# Patient Record
Sex: FEMALE | Race: WHITE
[De-identification: names, ages, dates, MRNs, and addresses within clinical notes are randomized per-mention and may not be internally consistent; named-entity substitution may affect disease eponyms.]

---

## 2017-07-03 ENCOUNTER — HOSPITAL ENCOUNTER (INPATIENT)
Dept: HOSPITAL 56 - MW.OBCHECK | Age: 32
LOS: 3 days | Discharge: HOME | End: 2017-07-06
Attending: OBSTETRICS & GYNECOLOGY | Admitting: OBSTETRICS & GYNECOLOGY
Payer: MEDICAID

## 2017-07-03 DIAGNOSIS — Z3A.39: ICD-10-CM

## 2017-07-04 RX ADMIN — BUTORPHANOL TARTRATE PRN MG: 1 INJECTION INTRAMUSCULAR; INTRAVENOUS at 03:03

## 2017-07-04 RX ADMIN — BUTORPHANOL TARTRATE PRN MG: 1 INJECTION INTRAMUSCULAR; INTRAVENOUS at 06:12

## 2017-07-04 RX ADMIN — BUTORPHANOL TARTRATE PRN MG: 1 INJECTION INTRAMUSCULAR; INTRAVENOUS at 12:53

## 2017-07-04 RX ADMIN — MISOPROSTOL PRN MCG: 100 TABLET ORAL at 06:09

## 2017-07-04 RX ADMIN — MISOPROSTOL PRN MCG: 100 TABLET ORAL at 01:00

## 2017-07-04 NOTE — PCM.PREANE
Preanesthetic Assessment





- Anesthesia/Transfusion/Family Hx


Anesthesia History: No Prior Anesthesia


Family History of Anesthesia Reaction: No


Transfusion History: No Prior Transfusion(s)





- Review of Systems


General: No Symptoms


Pulmonary: No Symptoms


Cardiovascular: No Symptoms


Gastrointestinal: No symptoms


Neurological: No Symptoms


Other: Reports: Diabetes (Gestational)





- Physical Assessment


NPO Status Date: 07/04/17


NPO Status Time: 15:48 (clear liquids)


Height: 1.57 m


Weight: 79.832 kg


ASA Class: 3


Mental Status: Alert & Oriented x3


Airway Class: Mallampati = 3


Dentition: Reports: Normal Dentition


Thyro-Mental Finger Breadths: 3


Mouth Opening Finger Breadths: 3


ROM/Head Extension: Full


Lungs: Clear to auscultation, Normal respiratory effort


Cardiovascular: Regular Rate, Regular Rhythm





- Lab


Values: 





 Laboratory Last Values











WBC  10.05 K/uL (4.0-11.0)   07/03/17  21:00    


 


RBC  4.00 M/uL (4.30-5.90)  L  07/03/17  21:00    


 


Hgb  9.9 g/dL (12.0-16.0)  L  07/03/17  21:00    


 


Hct  31.7 % (36.0-46.0)  L  07/03/17  21:00    


 


MCV  79.3 fL (80.0-98.0)  L  07/03/17  21:00    


 


MCH  24.8 pg (27.0-32.0)  L  07/03/17  21:00    


 


MCHC  31.2 g/dL (31.0-37.0)   07/03/17  21:00    


 


RDW Std Deviation  47.5 fl (28.0-62.0)   07/03/17  21:00    


 


RDW Coeff of Brayden  16 % (11.0-15.0)  H  07/03/17  21:00    


 


Plt Count  283 K/uL (150-400)   07/03/17  21:00    


 


MPV  11.80 fL (7.40-12.00)   07/03/17  21:00    


 


Nucleated RBC %  0.0 /100WBC  07/03/17  21:00    


 


Nucleated RBCs #  0 K/uL  07/03/17  21:00    


 


POC Glucose  120 mg/dL ()  H  07/04/17  07:54    


 


Blood Type  O POSITIVE   07/03/17  21:00    


 


Antibody Screen  NEGATIVE   07/03/17  21:00    














- Allergies


Allergies/Adverse Reactions: 


 Allergies











Allergy/AdvReac Type Severity Reaction Status Date / Time


 


No Known Allergies Allergy   Verified 11/04/16 09:11














- Blood


Blood Available: Yes


Product(s) Available: PRBC





- Acknowledgements


Anesthesia Type Planned: Epidural


Pt an Appropriate Candidate for the Planned Anesthesia: Yes


Alternatives and Risks of Anesthesia Discussed w Pt/Guardian: Yes


Pt/Guardian Understands and Agrees with Anesthesia Plan: Yes





PreAnesthesia Questionnaire


HEENT History: Reports: None


Cardiovascular History: Reports: None


Respiratory History: Reports: None


Gastrointestinal History: Reports: GERD


Genitourinary History: Reports: None


OB/GYN History: Reports: Pregnancy


Musculoskeletal History: Reports: None


Neurological History: Reports: None


Psychiatric History: Reports: None


Endocrine/Metabolic History: Reports: Diabetes, Gestational


Hematologic History: Reports: None


Immunologic History: Reports: None


Oncologic (Cancer) History: Reports: None


Dermatologic History: Reports: None





- Infectious Disease History


Infectious Disease History: Reports: None





- Past Surgical History


Head Surgeries/Procedures: Reports: None


HEENT Surgical History: Reports: Adenoidectomy, Tonsillectomy


Other HEENT Surgeries/Procedures: Tonsillectomy and Adenoidectomy 9/13/16


Cardiovascular Surgical History: Reports: None


Respiratory Surgical History: Reports: None


GI Surgical History: Reports: None


Female  Surgical History: Reports: None


Endocrine Surgical History: Reports: None


Neurological Surgical History: Reports: None


Musculoskeletal Surgical History: Reports: None


Dermatological Surgical History: Reports: None





- SUBSTANCE USE


Smoking Status *Q: Never Smoker


Second Hand Smoke Exposure: No


Recreational Drug Use History: No





- HOME MEDS


Home Medications: 


 Home Meds





Cetirizine [ZyrTEC] 10 mg PO DAILY 11/04/16 [History]


Excedrin Migraine  11/04/16 [History]


Omeprazole 20 mg PO DAILY 11/04/16 [History]


diphenhydrAMINE HCl [Benadryl] 25 mg PO 11/04/16 [History]











- CURRENT (IN HOUSE) MEDS


Current Meds: 





 Current Medications





Butorphanol Tartrate (Stadol)  1 mg IVPUSH ASDIRECTED PRN


   PRN Reason: Pain


   Last Admin: 07/04/17 06:12 Dose:  1 mg


Carboprost Tromethamine (Hemabate Ds)  250 mcg IM ASDIRECTED PRN


   PRN Reason: Post Partum Hemorrhage


Lactated Ringer's (Ringers, Lactated)  1,000 mls @ 150 mls/hr IV ASDIRECTED MARY


   Last Admin: 07/04/17 12:06 Dose:  150 mls/hr


Oxytocin/Lactated Ringer's (Pitocin In Lr 30 Units/500 Ml)  30 unit in 500 mls 

@ 2 mls/hr IV TITRATE MRAY; 2 MUNITS/MIN


   PRN Reason: Protocol


Lidocaine HCl (Xylocaine 1%)  50 ml INJECT .ONCE PRN


   PRN Reason: Laceration repair


Methylergonovine Maleate (Methergine)  0.2 mg IM ASDIRECTED PRN


   PRN Reason: Post Partum Hemorrhage


Misoprostol (Cytotec)  25 mcg VAG .ONCE MARY


   Last Admin: 07/03/17 21:12 Dose:  25 mcg


Misoprostol (Cytotec)  25 mcg VAG Q4H PRN


   PRN Reason: Cervical Ripening


   Stop: 07/05/17 00:40


   Last Admin: 07/04/17 06:09 Dose:  25 mcg


Misoprostol (Cytotec)  200 mcg PO .ONCE PRN


   PRN Reason: Post Partum Hemorrhage


Misoprostol (Cytotec)  25 mcg PO .ONCE MARY


   Last Admin: 07/03/17 21:12 Dose:  25 mcg


Misoprostol (Cytotec)  25 mcg PO Q4H PRN


   PRN Reason: Cervical Ripening


   Stop: 07/05/17 00:40


   Last Admin: 07/04/17 01:00 Dose:  25 mcg


Nalbuphine HCl (Nubain)  10 mg IVPUSH ASDIRECTED PRN


   PRN Reason: Pain (severe 7-10)


   Stop: 07/05/17 20:40


Sodium Chloride (Saline Flush)  10 ml FLUSH ASDIRECTED PRN


   PRN Reason: Keep Vein Open


Sodium Chloride (Saline Flush)  2.5 ml FLUSH ASDIRECTED PRN


   PRN Reason: Keep Vein Open


Sterile Water (Sterile Water For Irrigation)  1,000 ml IRR ASDIRECTED PRN


   PRN Reason: delivery


Terbutaline Sulfate (Brethine)  0.25 mg SUBCUT ASDIRECTED PRN


   PRN Reason: Tacysystole





Discontinued Medications





Oxytocin/Lactated Ringer's (Pitocin In Lr 30 Units/500 Ml)  30 unit in 500 mls 

@ 999 mls/hr IV ASDIRECTED MARY


   PRN Reason: 999 MUNITS/MIN


   Stop: 07/03/17 21:16











- Free Text/Narrative


Note: 





Labor Analgesia/Epidural


Procedure start date: 07-04-17   time:  1500


  Attending provider aware  Chart reviewed  Permit signed   Labs reviewed   VS/

FHR reviewed


  Pt identified/ID band  Pt assessed  Risks/Benefits discussed and accepted  

Monitors in place (BP, HR,


SPO2)   Patient, Site, Procedure Verification, Pause.  Pain "10/10"


  Fluid bolus infused (fluid type and amount): 1000 ml LR


Position:    Sitting   @ 1507


Prep:      Betadine X 3   


       Sterile Drape


Intradermal Wheal:   3 ml 1% Lidocaine    


Regional placement level:   L4-5 


Needle:   17 g Tuohy   


Approach:   Midline   


Technique:   CAROLINA glass syringe w 3 ml Sterile water    


CAROLINA needle depth:  6  cm


Paresthesia:   None    


Fluid Obtained:   None    


Catheter insertion time: 1514


Catheter depth at skin:  20 cm


Test Dose  


              Time: 1515


   RX:    3  ml 1.5% lidocaine with 1:200,000 epi   


    Response:   Negative  


Loading dose


              Time: 9270-7542


    RX: 100 mcg fentanyl followed by 10 ml 0.2% ropivacaine in 2 ml increments 

over 11 minutes


    Pt position: semi fowlers with JEEVAN


Continuous infusion


    Start Time: 1535                              


    RX: 100 ml 0.2% ropivacaine with 200 mcg fentanyl [2mcg/ml]


    Continuous infusion rate: 8 ml/hr


    PCA bolus option: 5 ml every 15 min   


Pt response


    Post procedure pain level:   "0/10" 


VS and FHR monitored in unit post placement (See OB traceview for documentation.

)





Procedure end date: 07/04/17   time:  1601

## 2017-07-05 PROCEDURE — 10H07YZ INSERTION OF OTHER DEVICE INTO PRODUCTS OF CONCEPTION, VIA NATURAL OR ARTIFICIAL OPENING: ICD-10-PCS | Performed by: OBSTETRICS & GYNECOLOGY

## 2017-07-05 PROCEDURE — 4A1H7CZ MONITORING OF PRODUCTS OF CONCEPTION, CARDIAC RATE, VIA NATURAL OR ARTIFICIAL OPENING: ICD-10-PCS | Performed by: OBSTETRICS & GYNECOLOGY

## 2017-07-05 PROCEDURE — 3E0P7GC INTRODUCTION OF OTHER THERAPEUTIC SUBSTANCE INTO FEMALE REPRODUCTIVE, VIA NATURAL OR ARTIFICIAL OPENING: ICD-10-PCS | Performed by: OBSTETRICS & GYNECOLOGY

## 2017-07-05 PROCEDURE — 0KQM0ZZ REPAIR PERINEUM MUSCLE, OPEN APPROACH: ICD-10-PCS | Performed by: OBSTETRICS & GYNECOLOGY

## 2017-07-05 NOTE — OR
SURGEON:

Carrie Albrecht

 

DATE OF PROCEDURE:  2017

 

PRE-DELIVERY HISTORY:

This is a 31-year-old, G1, P0, who presented to Labor and Delivery on 

for induction of labor at 39 weeks and 5 days for A2 diabetes in pregnancy.  The

patient was on glyburide during pregnancy for diabetes.  Blood sugars were well

controlled.  Otherwise, the patient has no other pregnancy complications other

than obesity.  The patient was started on vaginal misoprostol after NST was

significant for category 1 tracing and no regular contractions.  The patient did

receive a total of three doses of misoprostol, eventually broke her water, and

was wen regularly.  The patient was eventually started on IV Pitocin.

The patient eventually did receive an epidural for analgesia during labor.  On

exam by physician, IUPC and scalp electrode were placed secondary to difficulty

keeping with them otherwise.  Secondary to fetal heart rate tracing anywhere

from category 1 to category 2, but mostly category 2 concerns of Nursing staff

with occasional variable decelerations and early decelerations, Pitocin was

turned off multiple times, and did not get above 8 milliunits per minute.  The

IUPC eventually became nonfunctional.  The patient did receive a total of three

amnio infusions for variable decelerations.  Secondary to having taking a longer

period of time to put the patient into active labor because Pitocin was turned

off several times, the patient's rupture of membranes was noted to be prolonged.

The patient was started on IV Ancef for GBS protocol at approximately 20 hours

of rupture.  The patient eventually felt tremendous rectal pressure.  The 
patient

was finally examined by Nursing staff and found to be completely dilated and in

+3 station.  The patient started maternal expulsive efforts.

 

PREOPERATIVE DIAGNOSES:

1. Intrauterine pregnancy at 40 weeks.

2. Group B Streptococcus negative.

3. Induction of labor for A2 diabetes mellitus.

4. Prolonged rupture of membranes.

 

POSTOPERATIVE DIAGNOSES:

1. Intrauterine pregnancy at 40 weeks.

2. Delivery.

3. Second-degree vaginal laceration.

 

PROCEDURES PERFORMED:

1. Spontaneous-assisted vaginal delivery.

2. Repair of second-degree vaginal laceration.

 

ANESTHESIA TYPE:

Epidural and local.

 

ESTIMATED BLOOD LOSS:

Approximately 250 mL.

 

FINDINGS:

Viable male infant in vertex presentation with Apgars of 7 and 8 at one and five

minutes respectively, and weight of 3120 g.  Normal intact placenta with 3-

vessel cord.  Second-degree vaginal laceration.

 

DISPOSITION:

Mom and  tolerated the procedure well.

 

COMPLICATIONS:

None known.

 

DESCRIPTION OF PROCEDURE:

This  female under epidural anesthesia delivered a viable male infant

with Apgars of 7 and 8 at one and five minutes respectively and weight of 3120

g.  Delivery was via spontaneous vaginal delivery with infant in vertex

presentation.  Upon delivery of infant in vertex, the neck was checked.  There

was no nuchal to be reduced.  With gentle downward traction, the anterior

shoulder was delivered, followed by the fetal body.  The infant was bulb

suctioned at delivery.  Cord was doubly clamped and cut.  The infant went over

to the waiting baby nurse and pediatrician, Dr. Bell.  After delivery of the

infant, IV Pitocin was given as an uterotonic to prevent excessive maternal

blood loss and to help expel the placenta, therefore, actively managing the

third stage of labor.  With signs of placental separation, the fundal massage

was given and traction on the umbilical cord.  There was no further descent of

the cord.  So, a manual exam was completed, and it was noted that the uterus was

contracted down around the placenta.  The lower portion of the placenta was

extruding through the cervical opening.  The placenta was cleaved off the uterus

slowly, but carefully the placenta was removed in one piece. The  did

appear intact on further inspection.  After delivery of infant

and placenta, the vagina, perineum, and rectum were explored.  The patient had a

second-degree vaginal laceration that was repaired with 3-0 Vicryl suture in the

usual fashion.  Of note, the laceration did extend out to the left labia majora

and was reapproximated.  After repair, the lower uterine segment of vagina was

cleared of all clots and debris.  The patient was cleansed, pads were changed,

and the bed was returned to functioning status.  The patient and 

tolerated the procedure well.

 

COUNT RESULTS:

Sponge, lap, needle, and instrument counts were correct.

 

 

NEWTYOL / MODL

DD:  2017 05:17:43

DT:  2017 06:00:41

Job #:  [id number]

SUJEY

## 2017-07-05 NOTE — PCM.SN
- Free Text/Narrative


Note: 





Called to replace epidural infusion bag.  Pt states contractions are not 

painful but she is having increased pressure due to descent of fetus.  Has used 

PCA bolus button for back pain and it has been effective.  100 ml 0.2% 

ropivacaine with 2mcg/ml fentanyl replaced and rate continued as before at 8 ml/

hr with 5 ml every 15 min PCA bolus available.

## 2017-07-06 VITALS — SYSTOLIC BLOOD PRESSURE: 127 MMHG | DIASTOLIC BLOOD PRESSURE: 80 MMHG

## 2017-07-06 NOTE — PCM.PNPP
- General Info


Date of Service: 17


Functional Status: Reports: pain controlled, tolerating diet, ambulating, 

urinating





- Review of Systems


General: Denies: Fever, Weakness


Pulmonary: Denies: shortness of breath


Cardiovascular: Denies: Chest Pain, Palpitations, Lightheadedness


Gastrointestinal: Denies: Abdominal pain, Nausea, Vomiting


Genitourinary: Denies: flank pain


Neurological: Reports: No Symptoms


Psychiatric: Reports: no symptoms





- General Info


Date of Service: 17





- Patient Data


Vital Signs - most recent: 


 Last Vital Signs











Temp  37.3 C   17 08:15


 


Pulse  95   17 08:15


 


Resp  20   17 08:15


 


BP  127/80   17 08:15


 


Pulse Ox  99   17 08:15











Weight - most recent: 79.832 kg


Lab Results - last 24 hrs: 


 Laboratory Results - last 24 hr











  17 Range/Units





  05:02 05:02 


 


Hgb  8.5 L   (12.0-16.0)  g/dL


 


Hct  27.1 L   (36.0-46.0)  %


 


Fasting Glucose   86  ()  mg/dL











Med Orders - Current: 


 Current Medications





Acetaminophen (Tylenol Extra Strength)  500 mg PO Q4H PRN


   PRN Reason: Pain


   Last Admin: 17 08:16 Dose:  500 mg


Benzocaine/Menthol (Dermoplast Pain Relief 20%-0.5% Spray)  78 gm TOP 

ASDIRECTED PRN


   PRN Reason: Perineal Comfort Measure


Bisacodyl (Dulcolax)  10 mg RECTAL .ONCE PRN


   PRN Reason: Constipation


Butorphanol Tartrate (Stadol)  1 mg IVPUSH ASDIRECTED PRN


   PRN Reason: Pain


   Last Admin: 17 12:53 Dose:  1 mg


Carboprost Tromethamine (Hemabate Ds)  250 mcg IM ASDIRECTED PRN


   PRN Reason: Post Partum Hemorrhage


Docusate Sodium (Colace)  100 mg PO BID PRN


   PRN Reason: Constipation


Emollient Ointment (Lansinoh Hpa)  0 gm TOP ASDIRECTED PRN


   PRN Reason: Sore Nipples


Lactated Ringer's (Ringers, Lactated)  1,000 mls @ 150 mls/hr IV ASDIRECTED MARY


   Last Admin: 17 16:57 Dose:  150 mls/hr


Oxytocin/Lactated Ringer's (Pitocin In Lr 30 Units/500 Ml)  30 unit in 500 mls 

@ 2 mls/hr IV TITRATE MARY; 2 MUNITS/MIN


   PRN Reason: Protocol


   Last Titration: 17 20:57 Dose:  6 munits/min, 6 mls/hr


Sodium Chloride (Normal Saline)  1,000 mls @ 999 mls/hr .XX ASDIRECTED [facility]


   Last Admin: 17 21:27 Dose:  500 mls/hr


Sodium Chloride (Normal Saline)  500 mls @ 999 mls/hr .XX ASDIRECTED MARY


Cefazolin Sodium/Dextrose 1 gm (/ Premix)  50 mls @ 100 mls/hr IV Q8H MARY


Ibuprofen (Motrin)  800 mg PO Q6H PRN


   PRN Reason: Pain


Lidocaine HCl (Xylocaine 1%)  50 ml INJECT .ONCE PRN


   PRN Reason: Laceration repair


   Last Admin: 17 04:32 Dose:  50 ml


Methylergonovine Maleate (Methergine)  0.2 mg IM ASDIRECTED PRN


   PRN Reason: Post Partum Hemorrhage


Misoprostol (Cytotec)  25 mcg VAG .ONCE [facility]


   Last Admin: 17 21:12 Dose:  25 mcg


Misoprostol (Cytotec)  200 mcg PO .ONCE PRN


   PRN Reason: Post Partum Hemorrhage


Misoprostol (Cytotec)  25 mcg PO .ONCE [facility]


   Last Admin: 17 21:12 Dose:  25 mcg


Oxycodone HCl (Oxycodone)  5 mg PO Q2H PRN


   PRN Reason: Pain


Sodium Chloride (Saline Flush)  10 ml FLUSH ASDIRECTED PRN


   PRN Reason: Keep Vein Open


Sodium Chloride (Saline Flush)  2.5 ml FLUSH ASDIRECTED PRN


   PRN Reason: Keep Vein Open


Sterile Water (Sterile Water For Irrigation)  1,000 ml IRR ASDIRECTED PRN


   PRN Reason: delivery


   Last Admin: 17 04:27 Dose:  1,000 ml


Terbutaline Sulfate (Brethine)  0.25 mg SUBCUT ASDIRECTED PRN


   PRN Reason: Tacysystole


Witch Hazel (Tucks)  1 pad TOP ASDIRECTED PRN


   PRN Reason: comfort care





Discontinued Medications





Acetaminophen (Tylenol Extra Strength)  1,000 mg PO ONETIME ONE


   Stop: 17 23:47


   Last Admin: 17 00:01 Dose:  1,000 mg


Fentanyl (Sublimaze) Confirm Administered Dose 100 mcg .ROUTE .STK-MED ONE


   Stop: 17 14:57


Oxytocin/Lactated Ringer's (Pitocin In Lr 30 Units/500 Ml)  30 unit in 500 mls 

@ 2 mls/hr IV TITRATE MARY; 2 MUNITS/MIN


   PRN Reason: Protocol


Oxytocin/Lactated Ringer's (Pitocin In Lr 30 Units/500 Ml)  30 unit in 500 mls 

@ 999 mls/hr IV ASDIRECTED MARY


   PRN Reason: 999 MUNITS/MIN


   Stop: 17 21:16


Ropivacaine/Fentanyl/NS (Fentanyl 2 Mcg-Ropiv 0.2%-Ns) Confirm Administered 

Dose 100 mls @ as directed .ROUTE .STK-MED ONE


   Stop: 17 14:58


Cefazolin Sodium/Dextrose 2 gm (/ Premix)  50 mls @ 100 mls/hr IV ONETIME ONE


   Stop: 17 00:12


   Last Admin: 17 23:58 Dose:  100 mls/hr


Ropivacaine/Fentanyl/NS (Fentanyl 2 Mcg-Ropiv 0.2%-Ns) Confirm Administered 

Dose 100 mls @ as directed .ROUTE .STK-MED ONE


   Stop: 17 00:50


Misoprostol (Cytotec)  25 mcg VAG Q4H PRN


   PRN Reason: Cervical Ripening


   Stop: 17 00:40


   Last Admin: 17 06:09 Dose:  25 mcg


Misoprostol (Cytotec)  25 mcg PO Q4H PRN


   PRN Reason: Cervical Ripening


   Stop: 17 00:40


   Last Admin: 17 01:00 Dose:  25 mcg


Nalbuphine HCl (Nubain)  10 mg IVPUSH ASDIRECTED PRN


   PRN Reason: Pain (severe 7-10)


   Stop: 17 20:40


Ropivacaine (Naropin 0.2%) Confirm Administered Dose 20 ml .ROUTE .STK-MED ONE


   Stop: 17 14:58











- Infant Interaction


Support Person: Significant Other





- Postpartum Recovery Exam


Fundal Tone: Firm


Fundal Level: 1 Fingerbreadths Above Umbilicus


Fundal Placement: Midline


Lochia Amount: Scant


Lochia Color: Rubra/Red


Bladder Status: Voiding


Urinary Elimination: Voided





- Exam


General: alert, oriented


Lungs: Normal respiratory effort


Cardiovascular: Regular Rate, Regular Rhythm


Abdomen: bowel sounds present, soft.  No: CVA tenderness


Extremities: no calf tenderness


Skin: warm, dry, intact


Psy/Mental Status: alert, normal affect





- Problem List & Annotations


(1) Vaginal delivery


SNOMED Code(s): 224577041


   Code(s): O80 - ENCOUNTER FOR FULL-TERM UNCOMPLICATED DELIVERY   Status: 

Acute   Current Visit: Yes   





- Problem List Review


Problem List Initiated/Reviewed/Updated: Yes





- My Orders


Last 24 Hours: 


My Active Orders





17 11:00


Ready for Discharge [RC] PER UNIT ROUTINE 














- Assessment


Assessment:: 





PPD 1 status post 





- Plan


Plan:: 





Patient would like to go home today. Discharge instructions reviewed. Infection 

and bleeding warnings reviewed. Discharge to home, follow up 6weeks

## 2019-10-26 ENCOUNTER — HOSPITAL ENCOUNTER (EMERGENCY)
Dept: HOSPITAL 56 - MW.ED | Age: 34
Discharge: HOME | End: 2019-10-26
Payer: COMMERCIAL

## 2019-10-26 VITALS — SYSTOLIC BLOOD PRESSURE: 121 MMHG | HEART RATE: 93 BPM | DIASTOLIC BLOOD PRESSURE: 68 MMHG

## 2019-10-26 DIAGNOSIS — B02.9: Primary | ICD-10-CM

## 2019-10-26 NOTE — EDM.PDOC
ED HPI GENERAL MEDICAL PROBLEM





- General


Chief Complaint: Skin Complaint


Stated Complaint: RASH LEFT SIDE, ACHES


Time Seen by Provider: 10/26/19 15:32


Source of Information: Reports: Patient


History Limitations: Reports: No Limitations





- History of Present Illness


INITIAL COMMENTS - FREE TEXT/NARRATIVE: 


HISTORY AND PHYSICAL:





History of present illness:


Patient is a 33-year-old female who presents to the ED today with concern of a 

painful and itchy rash on the right flank area 2 days. Patient states the rash 

started off with some blisters but has now spread pretty quickly. Patient 

states she has not taken anything for her symptoms. Patient denies any other 

symptoms or concerns at this time.





Patient denies fever, chills, chest pain, shortness of breath, or cough. Denies 

headache, neck stiff ness, change in vision, syncope, or near syncope. Denies 

nausea, vomiting, abdominal pain, diarrhea, constipation, or dysuria. Has not 

noted any blood in urine or stool. Patient has been eating and drinking 

appropriately.





Review of systems: 


As per history of present illness and below otherwise all systems reviewed and 

negative.





Past medical history: 


As per history of present illness and as reviewed below otherwise 

noncontributory.





Surgical history: 


As per history of present illness and as reviewed below otherwise 

noncontributory.





Social history: 


See social history for further information





Family history: 


As per history of present illness and as reviewed below otherwise 

noncontributory.





Physical exam:


General: Patient is alert, oriented, and in no acute distress. Patient sitting 

comfortably on exam table.


HEENT: Atraumatic, normocephalic, pupils equal and reactive bilaterally, 

negative for conjunctival pallor or scleral icterus, mucous membranes moist, 

TMs normal bilaterally, throat clear, neck supple, nontender, trachea midline. 

No drooling or trismus noted. No meningeal signs. No hot potato voice noted. 


Lungs: Clear to auscultation, breath sounds equal bilaterally, chest nontender.


Heart: S1S2, regular rate and rhythm without overt murmur


Abdomen: Soft, nondistended, nontender. Negative for masses or 

hepatosplenomegaly. Negative for costovertebral tenderness.


Pelvis: Stable nontender.


Genitourinary: Deferred.


Rectal: Deferred.


Skin: There is a blister like stripe on the right side trunk of the body 

following the dermatome pattern. This rash does not cross the central body and 

only in a dermatome pattern.


Extremities: Atraumatic, negative for cords or calf pain. Neurovascular 

unremarkable.


Neuro: Awake, alert, oriented. Cranial nerves II through XII unremarkable. 

Cerebellum unremarkable. Motor and sensory unremarkable throughout. Exam 

nonfocal.





Notes:


Discussed the importance for follow-up with a primary care provider.


Voices understanding and is agreeable to plan of care. Denies any further 

questions or concerns at this time.





Diagnostics:


None





Therapeutics:


None





Prescription:


Acyclovir, Medrol Dosepak (pain medication offered but patient declines)





Impression: 


Shingles





Plan:


1. Take medication as prescribed. You can alternate ibuprofen and Tylenol as 

directed for pain and discomfort.


2. Follow-up with her primary care provider as discussed. Return to the ED as 

needed and as discussed.





Definitive disposition and diagnosis as appropriate pending reevaluation and 

review of above.





  ** right side


Pain Score (Numeric/FACES): 8





- Related Data


 Allergies











Allergy/AdvReac Type Severity Reaction Status Date / Time


 


No Known Allergies Allergy   Verified 10/26/19 15:15











Home Meds: 


 Home Meds





. [No Known Home Meds]  10/26/19 [History]











Past Medical History





- Past Health History


Medical/Surgical History: Denies Medical/Surgical History


HEENT History: Reports: None


Cardiovascular History: Reports: None


Respiratory History: Reports: None


Gastrointestinal History: Reports: GERD


Genitourinary History: Reports: None


OB/GYN History: Reports: Pregnancy


Musculoskeletal History: Reports: None


Neurological History: Reports: None


Psychiatric History: Reports: None


Endocrine/Metabolic History: Reports: Diabetes, Gestational


Hematologic History: Reports: None


Immunologic History: Reports: None


Oncologic (Cancer) History: Reports: None


Dermatologic History: Reports: None





- Infectious Disease History


Infectious Disease History: Reports: None





- Past Surgical History


Head Surgeries/Procedures: Reports: None


HEENT Surgical History: Reports: Adenoidectomy, Tonsillectomy


Other HEENT Surgeries/Procedures: Tonsillectomy and Adenoidectomy 9/13/16


Cardiovascular Surgical History: Reports: None


Respiratory Surgical History: Reports: None


GI Surgical History: Reports: None


Female  Surgical History: Reports: None


Endocrine Surgical History: Reports: None


Neurological Surgical History: Reports: None


Musculoskeletal Surgical History: Reports: None


Dermatological Surgical History: Reports: None





Social & Family History





- Family History


Family Medical History: Noncontributory





- Tobacco Use


Smoking Status *Q: Never Smoker





- Caffeine Use


Caffeine Use: Reports: Coffee





- Recreational Drug Use


Recreational Drug Use: No





ED ROS GENERAL





- Review of Systems


Review Of Systems: ROS reveals no pertinent complaints other than HPI.





ED EXAM, SKIN/RASH


Exam: See Below (See dictation)





Course





- Vital Signs


Last Recorded V/S: 


 Last Vital Signs











Temp  96.6 F   10/26/19 15:13


 


Pulse  93   10/26/19 15:13


 


Resp  18   10/26/19 15:13


 


BP  121/68   10/26/19 15:13


 


Pulse Ox  100   10/26/19 15:13














Departure





- Departure


Time of Disposition: 15:42


Disposition: Home, Self-Care 01


Clinical Impression: 


Shingles


Qualifiers:


 Herpes zoster complications: without complications Qualified Code(s): B02.9 - 

Zoster without complications








- Discharge Information


Referrals: 


Rani Avery NP [Primary Care Provider] - 


Forms:  ED Department Discharge


Additional Instructions: 


The following information is given to patients seen in the emergency department 

who are being discharged to home. This information is to outline your options 

for follow-up care. We provide all patients seen in our emergency department 

with a follow-up referral.





The need for follow-up, as well as the timing and circumstances, are variable 

depending upon the specifics of your emergency department visit.





If you don't have a primary care physician on staff, we will provide you with a 

referral. We always advise you to contact your personal physician following an 

emergency department visit to inform them of the circumstance of the visit and 

for follow-up with them and/or the need for any referrals to a consulting 

specialist.





The emergency department will also refer you to a specialist when appropriate. 

This referral assures that you have the opportunity for follow-up care with a 

specialist. All of these measure are taken in an effort to provide you with 

optimal care, which includes your follow-up.





Under all circumstances we always encourage you to contact your private 

physician who remains a resource for coordinating your care. When calling for 

follow-up care, please make the office aware that this follow-up is from your 

recent emergency room visit. If for any reason you are refused follow-up, 

please contact the [facility] Emergency 

Department at [phone] and asked to speak to the emergency department 

charge nurse.





[facility]


Primary Care


[address] [place] [address]


Phone: [phone]


Fax: [phone]





[facility]


[address] [place] [id number]


Phone: [phone]


Fax: [phone]





1. Take medication as prescribed. You can alternate ibuprofen and Tylenol as 

directed for pain and discomfort.


2. Follow-up with her primary care provider as discussed. Return to the ED as 

needed and as discussed.

## 2019-12-24 ENCOUNTER — HOSPITAL ENCOUNTER (EMERGENCY)
Dept: HOSPITAL 56 - MW.ED | Age: 34
LOS: 1 days | Discharge: HOME | End: 2019-12-25
Payer: COMMERCIAL

## 2019-12-24 DIAGNOSIS — B34.9: Primary | ICD-10-CM

## 2019-12-25 VITALS — SYSTOLIC BLOOD PRESSURE: 125 MMHG | HEART RATE: 96 BPM | DIASTOLIC BLOOD PRESSURE: 88 MMHG

## 2019-12-25 NOTE — EDM.PDOC
ED HPI GENERAL MEDICAL PROBLEM





- General


Chief Complaint: ENT Problem


Stated Complaint: SORE THROAT


Time Seen by Provider: 12/24/19 23:49


Source of Information: Reports: Patient


History Limitations: Reports: No Limitations





- History of Present Illness


INITIAL COMMENTS - FREE TEXT/NARRATIVE: 





34-year-old female who presents the emergency room chief complaint of sore 

throat and possible exudates on her tonsils.  Patient states this is been going 

on for the past 4 days.


Onset: Today, Gradual


Duration: Day(s):, Getting Worse


Severity: Mild


Improves with: Reports: None


Worsens with: Reports: None


Associated Symptoms: Reports: No Other Symptoms


  ** throat


Pain Score (Numeric/FACES): 5





- Related Data


 Allergies











Allergy/AdvReac Type Severity Reaction Status Date / Time


 


No Known Allergies Allergy   Verified 12/24/19 23:52











Home Meds: 


 Home Meds





. [No Known Home Meds]  10/26/19 [History]











Past Medical History





- Past Health History


Medical/Surgical History: Denies Medical/Surgical History


HEENT History: Reports: None


Cardiovascular History: Reports: None


Respiratory History: Reports: None


Gastrointestinal History: Reports: GERD


Genitourinary History: Reports: None


OB/GYN History: Reports: Pregnancy


Musculoskeletal History: Reports: None


Neurological History: Reports: None


Psychiatric History: Reports: None


Endocrine/Metabolic History: Reports: Diabetes, Gestational


Hematologic History: Reports: None


Immunologic History: Reports: None


Oncologic (Cancer) History: Reports: None


Dermatologic History: Reports: None





- Infectious Disease History


Infectious Disease History: Reports: None





- Past Surgical History


Head Surgeries/Procedures: Reports: None


HEENT Surgical History: Reports: Adenoidectomy, Tonsillectomy


Other HEENT Surgeries/Procedures: Tonsillectomy and Adenoidectomy 9/13/16


Cardiovascular Surgical History: Reports: None


Respiratory Surgical History: Reports: None


GI Surgical History: Reports: None


Female  Surgical History: Reports: None


Endocrine Surgical History: Reports: None


Neurological Surgical History: Reports: None


Musculoskeletal Surgical History: Reports: None


Dermatological Surgical History: Reports: None





Social & Family History





- Family History


Family Medical History: Noncontributory





- Tobacco Use


Smoking Status *Q: Never Smoker





- Caffeine Use


Caffeine Use: Reports: Coffee





- Recreational Drug Use


Recreational Drug Use: No





ED ROS ENT





- Review of Systems


Review Of Systems: See Below


Constitutional: Reports: No Symptoms


HEENT: Reports: Throat Pain


Respiratory: Reports: No Symptoms


Cardiovascular: Reports: No Symptoms


Endocrine: Reports: No Symptoms


GI/Abdominal: Reports: No Symptoms


: Reports: No Symptoms


Musculoskeletal: Reports: No Symptoms


Skin: Reports: No Symptoms


Neurological: Reports: No Symptoms


Psychiatric: Reports: No Symptoms


Hematologic/Lymphatic: Reports: No Symptoms


Immunologic: Reports: No Symptoms





ED EXAM, ENT





- Physical Exam


Exam: See Below


Text/Narrative:: 





-34year-old female presents to the emergency room with sore throat.  Exudates 

on his tonsils.


Exam Limited By: No Limitations


General Appearance: Alert, WD/WN, No Apparent Distress


Eye Exam: Bilateral Eye: Abnormal EOM, Abnormal Pupil, Normal Fundi, Normal 

Inspection


Ears: Normal External Exam, Normal Canal, Normal TMs


Nose: Normal Inspection, Normal Mucousa


Mouth/Throat: Normal Inspection, Normal Gums, Normal Oropharynx, Normal Teeth, 

Throat Pain


Head: Atraumatic, Normocephalic


Neck: Normal Inspection, Supple, Non-Tender


Respiratory/Chest: No Respiratory Distress, Lungs Clear, Normal Breath Sounds, 

No Accessory Muscle Use


Cardiovascular: Normal Peripheral Pulses, Regular Rate, Rhythm, No Edema, No JVD

, No Murmur


GI/Abdominal: Normal Bowel Sounds, Soft, Non-Tender


 (Female) Exam: Deferred


Rectal (Female) Exam: Deferred


Back: Normal Inspection, Full Range of Motion


Neurological: Alert, Oriented, CN II-XII Intact


Psychiatric: Normal Affect, Normal Mood


Skin: Warm, Dry, Intact, Normal Color





Course





- Vital Signs


Last Recorded V/S: 





 Last Vital Signs











Temp  98.1 F   12/24/19 23:40


 


Pulse  90   12/24/19 23:40


 


Resp  18   12/24/19 23:40


 


BP  117/74   12/24/19 23:40


 


Pulse Ox  97   12/24/19 23:40














- Orders/Labs/Meds


Orders: 





 Active Orders 24 hr











 Category Date Time Status


 


 CULTURE STREP A CONFIRMATION [RM] Stat Lab  12/25/19 00:21 Results


 


 STREP SCRN A RAPID W CULT CONF [RM] Stat Lab  12/25/19 00:21 Results











Labs: 





 Laboratory Tests











  12/25/19 Range/Units





  01:03 


 


Monoscreen  NEGATIVE  (NEG)  














Departure





- Departure


Time of Disposition: 01:38


Disposition: Home, Self-Care 01


Condition: Good


Clinical Impression: 


 Viral syndrome








- Discharge Information


Instructions:  Viral Illness, Adult


Referrals: 


Rani Avery NP [Primary Care Provider] - 





Sepsis Event Note





- Evaluation


Sepsis Screening Result: No Definite Risk





- Focused Exam


Vital Signs: 





 Vital Signs











  Temp Pulse Resp BP Pulse Ox


 


 12/24/19 23:40  98.1 F  90  18  117/74  97











Date Exam was Performed: 12/25/19


Time Exam was Performed: 01:33





- My Orders


Last 24 Hours: 





My Active Orders





12/25/19 00:21


CULTURE STREP A CONFIRMATION [RM] Stat 


STREP SCRN A RAPID W CULT CONF [RM] Stat 














- Assessment/Plan


Last 24 Hours: 





My Active Orders





12/25/19 00:21


CULTURE STREP A CONFIRMATION [RM] Stat 


STREP SCRN A RAPID W CULT CONF [RM] Stat

## 2023-07-24 NOTE — EDM.PDOCBH
ED HPI GENERAL MEDICAL PROBLEM





- General


Chief Complaint: Behavioral/Psych


Stated Complaint: MENTAL HEALTH


Time Seen by Provider: 09/17/20 17:20


Source of Information: Reports: Patient, EMS


History Limitations: Reports: No Limitations





- History of Present Illness


INITIAL COMMENTS - FREE TEXT/NARRATIVE: 


HISTORY AND PHYSICAL:





History of present illness:


Patient is a 34-year-old female who presents to the emergency room with 

complaints of suicidal ideation.  She states last evening she was thinking about

taking her prescription medications "all at once" and mixing them with alcohol. 

She states her son was with her and decided against taking the meds/ETOH.  She 

has been having this idea "bounce back and forth" between following through and 

"living for my son".  She called a counselor whom she confided in, they 

encouraged her to come to the emergency room for further evaluation.  Upon 

arrival the patient states she continues to have thoughts of harming herself and

would like psychiatric help.  No previous history of admission for the [place] 

ideation/bipolar depression.  States she is not taking any excess prescription 

medications, over-the-counter medications, street drugs or alcohol.





Review of systems: 


As per history of present illness and below otherwise all systems reviewed and 

negative.





Past medical history: 


As per history of present illness and as reviewed below otherwise 

noncontributory.





Surgical history: 


As per history of present illness and as reviewed below otherwise 

noncontributory.





Social history: 


See social history for further information





Family history: 


As per history of present illness and as reviewed below otherwise 

noncontributory.





Physical exam:


General: Well developed and well nourished 34 year old female. Alert and 

orientated x 3. Flat affect, avoids eye contact, nontoxic in appearance and in 

no acute distress. Vital signs are stable and have been reviewed by me. Nursing 

notes were reviewed. 


HEENT: Atraumatic, normocephalic, pupils equal and reactive bilaterally, 

negative for conjunctival pallor or scleral icterus, mucous membranes moist, 

trachea midline. No drooling or trismus noted. No meningeal signs. No hot potato

voice noted. 


Lungs: Clear to auscultation, breath sounds equal bilaterally. Normal work of 

breathing, no accessory muscles used.


Heart: S1S2, regular rate and rhythm without overt murmur


Abdomen: Soft, nondistended, nontender. Negative for masses or 

hepatosplenomegaly. Negative for costovertebral tenderness.


Skin: Intact, warm, dry. No lesions or rashes noted.


Hematologic: No petechiae or purpra. Mucosa appropriate color and normal nail 

bed color and refill.


Extremities: Atraumatic, moves all extremities per self without difficulty or 

deficits. Neurovascular unremarkable.


Neuro: Awake, alert, oriented. Cranial nerves II through XII unremarkable. 

Cerebellum unremarkable. Motor and sensory unremarkable throughout. Exam 

nonfocal.





Notes:


An 'Emergency Hold' will be placed due to the patient's statements.  Staff 

member with patient 1:1. Patient was made aware of HOLD and is agreeable to plan

of care. Lab work is unremarkable. VSS. Patient remains calm and cooperative. 

COVID results pending (asymptomatic).





1815:[facility] has no beds available.


1820: [facility] does not have beds available.


1823: [facility], Dr. Lopez, psychiatrist on-call, was made aware of 

this patient.  Reviewed the patient with the provider and they are agreeable to 

accepting this patient for further care and management.  Patient was made aware 

of transfer and is agreeable to plan of care. No change in patient status.





Diagnostics:


CBC, CMP, TSH, EKG, COVID, Drug Screen Acetaminophen, Salicylate, UA, HCGU





Therapeutics:


None





Prescription:


None





Impression: 


Suicidal Ideation





Plan:


Transfer to [place] via S





Definitive disposition and diagnosis as appropriate pending reevaluation and 

review of above.








- Related Data


                                    Allergies











Allergy/AdvReac Type Severity Reaction Status Date / Time


 


No Known Allergies Allergy   Verified 09/17/20 17:11











Home Meds: 


                                    Home Meds





Birth Control  09/17/20 [History]


FLUoxetine [PROzac] 40 mg PO DAILY 09/17/20 [History]


hydrOXYzine HCL [hydrOXYzine] 25 mg PO DAILY 09/17/20 [History]











Past Medical History





- Past Health History


Medical/Surgical History: Denies Medical/Surgical History


HEENT History: Reports: None


Cardiovascular History: Reports: None


Respiratory History: Reports: None


Gastrointestinal History: Reports: GERD


Genitourinary History: Reports: None


OB/GYN History: Reports: Pregnancy


Musculoskeletal History: Reports: None


Neurological History: Reports: None


Psychiatric History: Reports: None


Endocrine/Metabolic History: Reports: Diabetes, Gestational


Hematologic History: Reports: None


Immunologic History: Reports: None


Oncologic (Cancer) History: Reports: None


Dermatologic History: Reports: None





- Infectious Disease History


Infectious Disease History: Reports: None





- Past Surgical History


Head Surgeries/Procedures: Reports: None


HEENT Surgical History: Reports: Adenoidectomy, Tonsillectomy


Other HEENT Surgeries/Procedures: Tonsillectomy and Adenoidectomy 9/13/16


Cardiovascular Surgical History: Reports: None


Respiratory Surgical History: Reports: None


GI Surgical History: Reports: None


Female  Surgical History: Reports: None


Endocrine Surgical History: Reports: None


Neurological Surgical History: Reports: None


Musculoskeletal Surgical History: Reports: None


Dermatological Surgical History: Reports: None





Social & Family History





- Family History


Family Medical History: Noncontributory





- Caffeine Use


Caffeine Use: Reports: Coffee





ED ROS GENERAL





- Review of Systems


Review Of Systems: Comprehensive ROS is negative, except as noted in HPI.





ED EXAM, BEHAVIORAL HEALTH





- Physical Exam


Exam: See Below (See dictation)





COURSE, BEHAVIORAL HEALTH COMP





- Course


Vital Signs: 


                                Last Vital Signs











Temp  98.0 F   09/17/20 17:13


 


Pulse  95   09/17/20 18:47


 


Resp  17   09/17/20 17:13


 


BP  129/73   09/17/20 18:47


 


Pulse Ox  99   09/17/20 18:03











Orders, Labs, Meds: 


                               Active Orders 24 hr











 Category Date Time Status


 


 EKG 12 Lead [EKG Documentation Completion] [RC] STAT Care  09/17/20 17:20 

Active


 


 CORONAVIRUS COVID-19 PCR PHL Stat Lab  09/17/20 18:27 Received








                                Laboratory Tests











  09/17/20 09/17/20 09/17/20 Range/Units





  17:25 17:25 17:25 


 


WBC     (4.0-11.0)  K/uL


 


RBC     (4.30-5.90)  M/uL


 


Hgb     (12.0-16.0)  g/dL


 


Hct     (36.0-46.0)  %


 


MCV     (80.0-98.0)  fL


 


MCH     (27.0-32.0)  pg


 


MCHC     (31.0-37.0)  g/dL


 


RDW Std Deviation     (28.0-62.0)  fl


 


RDW Coeff of Brayden     (11.0-15.0)  %


 


Plt Count     (150-400)  K/uL


 


MPV     (7.40-12.00)  fL


 


Neut % (Auto)     (48.0-80.0)  %


 


Lymph % (Auto)     (16.0-40.0)  %


 


Mono % (Auto)     (0.0-15.0)  %


 


Eos % (Auto)     (0.0-7.0)  %


 


Baso % (Auto)     (0.0-1.5)  %


 


Neut # (Auto)     (1.4-5.7)  K/uL


 


Lymph # (Auto)     (0.6-2.4)  K/uL


 


Mono # (Auto)     (0.0-0.8)  K/uL


 


Eos # (Auto)     (0.0-0.7)  K/uL


 


Baso # (Auto)     (0.0-0.1)  K/uL


 


Nucleated RBC %     /100WBC


 


Nucleated RBCs #     K/uL


 


Sodium     (136-145)  mmol/L


 


Potassium     (3.5-5.1)  mmol/L


 


Chloride     ()  mmol/L


 


Carbon Dioxide     (21.0-32.0)  mmol/L


 


BUN     (7.0-18.0)  mg/dL


 


Creatinine     (0.6-1.0)  mg/dL


 


Est Cr Clr Drug Dosing     mL/min


 


Estimated GFR (MDRD)     ml/min


 


Glucose     ()  mg/dL


 


Calcium     (8.5-10.1)  mg/dL


 


Total Bilirubin     (0.2-1.0)  mg/dL


 


AST     (15-37)  IU/L


 


ALT     (14-63)  IU/L


 


Alkaline Phosphatase     ()  U/L


 


Total Protein     (6.4-8.2)  g/dL


 


Albumin     (3.4-5.0)  g/dL


 


Globulin     (2.6-4.0)  g/dL


 


Albumin/Globulin Ratio     (0.9-1.6)  


 


TSH 3rd Generation     (0.36-3.74)  uIU/mL


 


Urine Color  YELLOW    


 


Urine Appearance  CLEAR    


 


Urine pH  6.0    (5.0-8.0)  


 


Ur Specific Gravity  >= 1.030    (1.001-1.035)  


 


Urine Protein  TRACE H    (NEGATIVE)  mg/dL


 


Urine Glucose (UA)  NEGATIVE    (NEGATIVE)  mg/dL


 


Urine Ketones  TRACE H    (NEGATIVE)  mg/dL


 


Urine Occult Blood  NEGATIVE    (NEGATIVE)  


 


Urine Nitrite  NEGATIVE    (NEGATIVE)  


 


Urine Bilirubin  NEGATIVE    (NEGATIVE)  


 


Urine Urobilinogen  0.2    (<2.0)  EU/dL


 


Ur Leukocyte Esterase  NEGATIVE    (NEGATIVE)  


 


Urine RBC  0-1    (0-2/HPF)  


 


Urine WBC  0-1    (0-5/HPF)  


 


Ur Epithelial Cells  FEW    (NONE-FEW)  


 


Urine Bacteria  RARE    (NEGATIVE)  


 


Urine HCG, Qual   NEGATIVE   (NEGATIVE)  


 


Salicylates     (0-20)  mg/dL


 


Urine Opiates Screen    NEGATIVE  (NEGATIVE)  


 


Ur Oxycodone Screen    NEGATIVE  (NEGATIVE)  


 


Urine Methadone Screen    NEGATIVE  (NEGATIVE)  


 


Acetaminophen     ug/mL


 


Ur Barbiturates Screen    NEGATIVE  (NEGATIVE)  


 


Ur Phencyclidine Scrn    NEGATIVE  (NEGATIVE)  


 


Ur Amphetamine Screen    NEGATIVE  (NEGATIVE)  


 


U Methamphetamines Scrn    NEGATIVE  (NEGATIVE)  


 


U Benzodiazepines Scrn    NEGATIVE  (NEGATIVE)  


 


U Cocaine Metab Screen    NEGATIVE  (NEGATIVE)  


 


U Marijuana (THC) Screen    NEGATIVE  (NEGATIVE)  


 


Ethyl Alcohol     mg/dL


 


SARS CoV-2 RNA Rapid MINISTERIO     (NEGATIVE)  














  09/17/20 09/17/20 09/17/20 Range/Units





  17:34 17:34 18:27 


 


WBC  7.79    (4.0-11.0)  K/uL


 


RBC  4.47    (4.30-5.90)  M/uL


 


Hgb  12.8    (12.0-16.0)  g/dL


 


Hct  38.4    (36.0-46.0)  %


 


MCV  85.9    (80.0-98.0)  fL


 


MCH  28.6    (27.0-32.0)  pg


 


MCHC  33.3    (31.0-37.0)  g/dL


 


RDW Std Deviation  42.0    (28.0-62.0)  fl


 


RDW Coeff of Brayden  13    (11.0-15.0)  %


 


Plt Count  303    (150-400)  K/uL


 


MPV  10.20    (7.40-12.00)  fL


 


Neut % (Auto)  70.8    (48.0-80.0)  %


 


Lymph % (Auto)  20.9    (16.0-40.0)  %


 


Mono % (Auto)  6.9    (0.0-15.0)  %


 


Eos % (Auto)  1.0    (0.0-7.0)  %


 


Baso % (Auto)  0.4    (0.0-1.5)  %


 


Neut # (Auto)  5.5    (1.4-5.7)  K/uL


 


Lymph # (Auto)  1.6    (0.6-2.4)  K/uL


 


Mono # (Auto)  0.5    (0.0-0.8)  K/uL


 


Eos # (Auto)  0.1    (0.0-0.7)  K/uL


 


Baso # (Auto)  0.0    (0.0-0.1)  K/uL


 


Nucleated RBC %  0.0    /100WBC


 


Nucleated RBCs #  0    K/uL


 


Sodium   142   (136-145)  mmol/L


 


Potassium   3.4 L   (3.5-5.1)  mmol/L


 


Chloride   103   ()  mmol/L


 


Carbon Dioxide   21.3   (21.0-32.0)  mmol/L


 


BUN   19 H   (7.0-18.0)  mg/dL


 


Creatinine   0.9   (0.6-1.0)  mg/dL


 


Est Cr Clr Drug Dosing   82.45   mL/min


 


Estimated GFR (MDRD)   > 60.0   ml/min


 


Glucose   100   ()  mg/dL


 


Calcium   9.3   (8.5-10.1)  mg/dL


 


Total Bilirubin   0.2   (0.2-1.0)  mg/dL


 


AST   21   (15-37)  IU/L


 


ALT   31   (14-63)  IU/L


 


Alkaline Phosphatase   67   ()  U/L


 


Total Protein   7.7   (6.4-8.2)  g/dL


 


Albumin   3.8   (3.4-5.0)  g/dL


 


Globulin   3.9   (2.6-4.0)  g/dL


 


Albumin/Globulin Ratio   1.0   (0.9-1.6)  


 


TSH 3rd Generation   2.54   (0.36-3.74)  uIU/mL


 


Urine Color     


 


Urine Appearance     


 


Urine pH     (5.0-8.0)  


 


Ur Specific Gravity     (1.001-1.035)  


 


Urine Protein     (NEGATIVE)  mg/dL


 


Urine Glucose (UA)     (NEGATIVE)  mg/dL


 


Urine Ketones     (NEGATIVE)  mg/dL


 


Urine Occult Blood     (NEGATIVE)  


 


Urine Nitrite     (NEGATIVE)  


 


Urine Bilirubin     (NEGATIVE)  


 


Urine Urobilinogen     (<2.0)  EU/dL


 


Ur Leukocyte Esterase     (NEGATIVE)  


 


Urine RBC     (0-2/HPF)  


 


Urine WBC     (0-5/HPF)  


 


Ur Epithelial Cells     (NONE-FEW)  


 


Urine Bacteria     (NEGATIVE)  


 


Urine HCG, Qual     (NEGATIVE)  


 


Salicylates   1.1   (0-20)  mg/dL


 


Urine Opiates Screen     (NEGATIVE)  


 


Ur Oxycodone Screen     (NEGATIVE)  


 


Urine Methadone Screen     (NEGATIVE)  


 


Acetaminophen   <2.0   ug/mL


 


Ur Barbiturates Screen     (NEGATIVE)  


 


Ur Phencyclidine Scrn     (NEGATIVE)  


 


Ur Amphetamine Screen     (NEGATIVE)  


 


U Methamphetamines Scrn     (NEGATIVE)  


 


U Benzodiazepines Scrn     (NEGATIVE)  


 


U Cocaine Metab Screen     (NEGATIVE)  


 


U Marijuana (THC) Screen     (NEGATIVE)  


 


Ethyl Alcohol   < 3.0   mg/dL


 


SARS CoV-2 RNA Rapid MINISTERIO    NEGATIVE  (NEGATIVE)  














Departure





- Departure


Time of Disposition: 18:57


Disposition: DC/Tfer to [facility]osp/[facility]


Clinical Impression: 


 Suicidal ideation








- Discharge Information


Referrals: 


Rani Avery NP [Primary Care Provider] - 





Sepsis Event Note (ED)





- Focused Exam


Vital Signs: 


                                   Vital Signs











  Temp Pulse Resp BP Pulse Ox


 


 09/17/20 18:47   95   129/73 


 


 09/17/20 18:03   91   138/92 H  99


 


 09/17/20 17:13  98.0 F  105 H  17  134/80  98














- My Orders


Last 24 Hours: 


My Active Orders





09/17/20 17:20


EKG 12 Lead [EKG Documentation Completion] [RC] STAT 





09/17/20 18:27


CORONAVIRUS COVID-19 PCR PHL Stat 














- Assessment/Plan


Last 24 Hours: 


My Active Orders





09/17/20 17:20


EKG 12 Lead [EKG Documentation Completion] [RC] STAT 





09/17/20 18:27


CORONAVIRUS COVID-19 PCR PHL Stat [place] Pregnancy And Lactation Text: There is insufficient data to evaluate drug-associated risk for major birth defects, miscarriage, or other adverse maternal or fetal outcomes.  There is a pregnancy registry that monitors pregnancy outcomes in pregnant persons exposed to the medication during pregnancy.  It is unknown if this medication is excreted in breast milk.  Do not breastfeed during treatment and for about 4 weeks after the last dose.